# Patient Record
Sex: MALE | Race: WHITE | NOT HISPANIC OR LATINO | Employment: FULL TIME | URBAN - NONMETROPOLITAN AREA
[De-identification: names, ages, dates, MRNs, and addresses within clinical notes are randomized per-mention and may not be internally consistent; named-entity substitution may affect disease eponyms.]

---

## 2020-05-26 ENCOUNTER — OFFICE VISIT (OUTPATIENT)
Dept: INTERNAL MEDICINE | Facility: CLINIC | Age: 55
End: 2020-05-26

## 2020-05-26 VITALS
WEIGHT: 145 LBS | SYSTOLIC BLOOD PRESSURE: 148 MMHG | TEMPERATURE: 98.7 F | HEIGHT: 65 IN | HEART RATE: 107 BPM | BODY MASS INDEX: 24.16 KG/M2 | OXYGEN SATURATION: 98 % | DIASTOLIC BLOOD PRESSURE: 84 MMHG

## 2020-05-26 DIAGNOSIS — M79.642 LEFT HAND PAIN: ICD-10-CM

## 2020-05-26 DIAGNOSIS — R07.82 INTERCOSTAL PAIN: ICD-10-CM

## 2020-05-26 DIAGNOSIS — Z00.00 ANNUAL PHYSICAL EXAM: ICD-10-CM

## 2020-05-26 DIAGNOSIS — J44.9 CHRONIC OBSTRUCTIVE PULMONARY DISEASE, UNSPECIFIED COPD TYPE (HCC): ICD-10-CM

## 2020-05-26 DIAGNOSIS — Z12.5 SCREENING FOR PROSTATE CANCER: ICD-10-CM

## 2020-05-26 DIAGNOSIS — Z12.11 ENCOUNTER FOR COLORECTAL CANCER SCREENING: ICD-10-CM

## 2020-05-26 DIAGNOSIS — R09.89 BILATERAL CAROTID BRUITS: ICD-10-CM

## 2020-05-26 DIAGNOSIS — I10 ESSENTIAL HYPERTENSION: ICD-10-CM

## 2020-05-26 DIAGNOSIS — F17.200 NICOTINE DEPENDENCE WITH CURRENT USE: ICD-10-CM

## 2020-05-26 DIAGNOSIS — Z11.59 ENCOUNTER FOR HEPATITIS C SCREENING TEST FOR LOW RISK PATIENT: Primary | ICD-10-CM

## 2020-05-26 DIAGNOSIS — Z12.12 ENCOUNTER FOR COLORECTAL CANCER SCREENING: ICD-10-CM

## 2020-05-26 PROCEDURE — 99204 OFFICE O/P NEW MOD 45 MIN: CPT | Performed by: NURSE PRACTITIONER

## 2020-05-26 PROCEDURE — 93000 ELECTROCARDIOGRAM COMPLETE: CPT | Performed by: NURSE PRACTITIONER

## 2020-05-26 RX ORDER — LISINOPRIL 10 MG/1
10 TABLET ORAL DAILY
Qty: 30 TABLET | Refills: 5 | Status: SHIPPED | OUTPATIENT
Start: 2020-05-26

## 2020-05-26 RX ORDER — ALBUTEROL SULFATE 90 UG/1
2 AEROSOL, METERED RESPIRATORY (INHALATION) EVERY 4 HOURS PRN
Qty: 1 INHALER | Refills: 5 | Status: SHIPPED | OUTPATIENT
Start: 2020-05-26

## 2020-05-26 RX ORDER — OMEPRAZOLE 20 MG/1
20 CAPSULE, DELAYED RELEASE ORAL DAILY
COMMUNITY

## 2020-05-26 NOTE — PROGRESS NOTES
"Subjective   Nilson Waller is a 54 y.o. male.   Chief Complaint   Patient presents with   • Hand Problem     left    • Establish Care     wants yearly labs, but not today not fasting.       Mr. Waller presents to the clinic today to establish care. He is concerned about his left hand \" cannot remember how I hurt it\". Moderate pain with palpation of the PIP on pointer finger with swelling and reduced ROM. He had been taking ibuprofen and tylenol for mild relief in pain. He would like a x-ray and referral if needed.     Patient is current 3/4 ppd smoker, and had reduced recently (last 3-4 months). He was smoking 1 1/2 ppd. He started smoking at the ago of 18. He is fearful of cancer related to left sided chest pain, worse with inspiration intermittently. Completed EKG today and benign for ischemia/acute changes. He is wheezing in middle and lower bases of lungs and reduced throughout with worse reduction in the apexes. He reports poor compliance with spiriva related to cost. He does not use albuterol. Could not afford second inhaler. He would like to start medicine to take daily. Unable to afford spirivia at this time would be open to trying new medicine related to cost. Discussed smoking cessation with patient for 5 minutes of the total visit time. He is not ready to quit smoking and refused information or treatment at this time.    Carotid bruits auscultated today. Tachycardia present at visit today. He reports that he was anxious about our visit today. He does not drink a lot of water and drinks many soda drinks. His cardiac exam was benign. I had Dr. Guevara listen to heart sounds as well to rule out murmur. Patient taking ASA periodically. He does not currently blood pressure medicine, but was on medicine previously. Cannot remember today what he was on before. He denies ever needing to see Cardiologist and reports intermittent chest pain, not associated with activity, but is sharp in severity and occasionally radiates " "to neck. Patient reports duration as seconds or minutes. He reports last episode was while driving, at midclavicular line at 5th intercostal space and lasted less than a minute. He reports that \"it just went away on its own\".     Patient does not want immunizations completed today and refused TDAP.     Patient is interested in colonoscopy today.            The following portions of the patient's history were reviewed and updated as appropriate: allergies, current medications, past family history, past medical history, past social history, past surgical history and problem list.    Review of Systems   Constitutional: Positive for fatigue. Negative for activity change, appetite change, fever, unexpected weight gain and unexpected weight loss.   HENT: Positive for postnasal drip, rhinorrhea and sneezing. Negative for swollen glands, trouble swallowing and voice change.    Eyes: Negative for blurred vision and visual disturbance.   Respiratory: Positive for cough, chest tightness and shortness of breath.    Cardiovascular: Positive for chest pain. Negative for palpitations and leg swelling.        Unspecified chest pain, does not occur with activity and EKG normal.   Gastrointestinal: Negative for abdominal pain, constipation, diarrhea, nausea, vomiting and indigestion.   Endocrine: Negative for cold intolerance, heat intolerance, polydipsia and polyphagia.   Genitourinary: Positive for erectile dysfunction. Negative for dysuria and frequency.   Musculoskeletal: Positive for arthralgias, back pain and myalgias. Negative for joint swelling and neck pain.   Skin: Negative for color change, rash and skin lesions.   Allergic/Immunologic: Positive for environmental allergies.   Neurological: Negative for dizziness, weakness, headache, memory problem and confusion.   Hematological: Does not bruise/bleed easily.   Psychiatric/Behavioral: Positive for stress. Negative for agitation, hallucinations, sleep disturbance, suicidal " ideas and depressed mood. The patient is not nervous/anxious.        Objective   Past Medical History:   Diagnosis Date   • Arthritis    • Asthma    • GERD (gastroesophageal reflux disease)    • Hypertension    • Thyroid disorder       Past Surgical History:   Procedure Laterality Date   • APPENDECTOMY     • OTHER SURGICAL HISTORY      right elbow        Current Outpatient Medications:   •  omeprazole (priLOSEC) 20 MG capsule, Take 20 mg by mouth Daily., Disp: , Rfl:   •  albuterol sulfate  (90 Base) MCG/ACT inhaler, Inhale 2 puffs Every 4 (Four) Hours As Needed for Wheezing or Shortness of Air., Disp: 1 inhaler, Rfl: 5  •  fluticasone-salmeterol (ADVAIR) 100-50 MCG/DOSE DISKUS, Inhale 1 puff 2 (Two) Times a Day., Disp: 60 each, Rfl: 5  •  lisinopril (PRINIVIL,ZESTRIL) 10 MG tablet, Take 1 tablet by mouth Daily., Disp: 30 tablet, Rfl: 5     Vitals:    05/26/20 1533   BP: 148/84   Pulse: 107   Temp: 98.7 °F (37.1 °C)   SpO2: 98%         05/26/20  1533   Weight: 65.8 kg (145 lb)     Patient's Body mass index is 24.13 kg/m². BMI is within normal parameters. No follow-up required..      Physical Exam   Constitutional: He is oriented to person, place, and time. He appears well-developed and well-nourished.   HENT:   Head: Normocephalic and atraumatic.   Right Ear: Hearing, tympanic membrane, external ear and ear canal normal.   Left Ear: Hearing, tympanic membrane, external ear and ear canal normal.   Nose: Nose normal.   Mouth/Throat: Uvula is midline and oropharynx is clear and moist.   Eyes: Pupils are equal, round, and reactive to light. Conjunctivae and EOM are normal.   Neck: Normal range of motion. Neck supple. Carotid bruit is present. No thyromegaly present.   Cardiovascular: Normal rate, regular rhythm, normal heart sounds and intact distal pulses.   Pulmonary/Chest: Effort normal. He has decreased breath sounds in the right upper field and the left upper field. He has wheezes in the right middle field,  the right lower field, the left middle field and the left lower field.   Abdominal: Soft. Bowel sounds are normal. There is no tenderness.   Musculoskeletal:        Left hand: He exhibits decreased range of motion, bony tenderness and swelling. He exhibits normal capillary refill and no laceration. Normal sensation noted.        Hands:  Lymphadenopathy:     He has no cervical adenopathy.     He has no axillary adenopathy.   Neurological: He is alert and oriented to person, place, and time. He has normal strength. He displays a negative Romberg sign.   Skin: Skin is warm, dry and intact. Capillary refill takes less than 2 seconds. Turgor is normal. No rash noted. Nails show clubbing.   Psychiatric: Judgment and thought content normal. His mood appears anxious. His speech is rapid and/or pressured. He is agitated. Cognition and memory are normal.   Nursing note and vitals reviewed.            Assessment/Plan   Diagnoses and all orders for this visit:    1. Encounter for hepatitis C screening test for low risk patient (Primary)  -     Cancel: Hepatitis C Antibody; Future  -     Hepatitis C Antibody    2. Screening for prostate cancer  -     PSA Screen    3. Annual physical exam  -     Lipid Panel; Future  -     MicroAlbumin, Urine, Random - Urine, Clean Catch; Future  -     Urinalysis without microscopic (no culture) - Urine, Clean Catch; Future  -     Cancel: Uric Acid; Future  -     CBC & Differential  -     Comprehensive Metabolic Panel  -     TSH  -     Uric Acid    4. Left hand pain  -     XR Hand 3+ View Left; Future    5. Essential hypertension  -     lisinopril (PRINIVIL,ZESTRIL) 10 MG tablet; Take 1 tablet by mouth Daily.  Dispense: 30 tablet; Refill: 5    6. Encounter for colorectal cancer screening  -     Ambulatory Referral to Gastroenterology    7. Nicotine dependence with current use  -     CT Chest Low Dose Wo; Future    8. Bilateral carotid bruits  -     US Carotid Bilateral; Future    9. Chronic  "obstructive pulmonary disease, unspecified COPD type (CMS/[facility])  -     albuterol sulfate  (90 Base) MCG/ACT inhaler; Inhale 2 puffs Every 4 (Four) Hours As Needed for Wheezing or Shortness of Air.  Dispense: 1 inhaler; Refill: 5  -     fluticasone-salmeterol (ADVAIR) 100-50 MCG/DOSE DISKUS; Inhale 1 puff 2 (Two) Times a Day.  Dispense: 60 each; Refill: 5        Mr. Waller presents to the clinic today to establish care. He is concerned about his left hand \" cannot remember how I hurt it\". Moderate pain with palpation of the PIP on pointer finger with swelling and reduced ROM. Will complete a x-ray of hand and refer to OI if surgery is necessary.     Patient is current 3/4 ppd smoker, and had reduced recently (last 3-4 months). He was smoking 1 1/2 ppd. He started smoking at the ago of 18. He is fearful of cancer related to left sided chest pain, worse with inspiration intermittently. Completed EKG today and benign for ischemia/acute changes. He is wheezing in middle and lower bases of lungs and reduced throughout with worse reduction in the apexes. He reports poor compliance with spiriva related to cost. He does not use albuterol. Could not afford second inhaler. Will trial advair BID and albuterol as needed. He is agreeable to the medicine change. Will assess compliance at next visit.     Carotid bruits apprised today. Will completed doppler bilaterally. Will refer to vascular surgery if indicated. If 50-69% will recheck yearly. Patient taking ASA periodically. Advised to take daily. And eliminate diet high in fat, increase dietary fiber, and increase hydration.     Patient does not want immunizations completed today and refused TDAP. Will bring up at next month follow up.    Discussed smoking cessation with patient for 5 minutes of the total visit time. He is not ready to quit smoking and refused information or treatment at this time. Will reassess readiness to quit at every appointment and offer Wellbutrin " "next visit if he would like assistance. He had tried \"cold turkey\" and \"patches in the past without improvement\".    Patient is interested in colonoscopy today. Will place referral for Dr. Urbina to complete initial colonoscopy.     Patient is not fasting today and with his history of elevated cholesterol, advised the patient to return next week fasting to complete lipid panel and urine screen. Will review CBC, CMP, uric acid and TSH with patient next visit.       ECG 12 Lead  Date/Time: 5/27/2020 1:30 PM  Performed by: Bradford Guevara MD  Authorized by: Linda Wu APRN   Comparison: not compared with previous ECG   Rhythm: sinus rhythm    Clinical impression: normal ECG             "

## 2020-05-27 DIAGNOSIS — E06.3 HYPOTHYROIDISM DUE TO HASHIMOTO'S THYROIDITIS: Primary | ICD-10-CM

## 2020-05-27 DIAGNOSIS — E03.8 HYPOTHYROIDISM DUE TO HASHIMOTO'S THYROIDITIS: Primary | ICD-10-CM

## 2020-05-27 LAB
ALBUMIN SERPL-MCNC: 4.3 G/DL (ref 3.8–4.9)
ALBUMIN/GLOB SERPL: 1.7 {RATIO} (ref 1.2–2.2)
ALP SERPL-CCNC: 119 IU/L (ref 39–117)
ALT SERPL-CCNC: 30 IU/L (ref 0–44)
AST SERPL-CCNC: 23 IU/L (ref 0–40)
BASOPHILS # BLD AUTO: 0.1 X10E3/UL (ref 0–0.2)
BASOPHILS NFR BLD AUTO: 1 %
BILIRUB SERPL-MCNC: <0.2 MG/DL (ref 0–1.2)
BUN SERPL-MCNC: 16 MG/DL (ref 6–24)
BUN/CREAT SERPL: 15 (ref 9–20)
CALCIUM SERPL-MCNC: 9.4 MG/DL (ref 8.7–10.2)
CHLORIDE SERPL-SCNC: 108 MMOL/L (ref 96–106)
CO2 SERPL-SCNC: 21 MMOL/L (ref 20–29)
CREAT SERPL-MCNC: 1.1 MG/DL (ref 0.76–1.27)
EOSINOPHIL # BLD AUTO: 0.1 X10E3/UL (ref 0–0.4)
EOSINOPHIL NFR BLD AUTO: 1 %
ERYTHROCYTE [DISTWIDTH] IN BLOOD BY AUTOMATED COUNT: 13.4 % (ref 11.6–15.4)
GLOBULIN SER CALC-MCNC: 2.5 G/DL (ref 1.5–4.5)
GLUCOSE SERPL-MCNC: 100 MG/DL (ref 65–99)
HCT VFR BLD AUTO: 51 % (ref 37.5–51)
HCV AB S/CO SERPL IA: <0.1 S/CO RATIO (ref 0–0.9)
HGB BLD-MCNC: 17.6 G/DL (ref 13–17.7)
IMM GRANULOCYTES # BLD AUTO: 0 X10E3/UL (ref 0–0.1)
IMM GRANULOCYTES NFR BLD AUTO: 0 %
LYMPHOCYTES # BLD AUTO: 2.1 X10E3/UL (ref 0.7–3.1)
LYMPHOCYTES NFR BLD AUTO: 23 %
Lab: NORMAL
MCH RBC QN AUTO: 30.4 PG (ref 26.6–33)
MCHC RBC AUTO-ENTMCNC: 34.5 G/DL (ref 31.5–35.7)
MCV RBC AUTO: 88 FL (ref 79–97)
MONOCYTES # BLD AUTO: 0.5 X10E3/UL (ref 0.1–0.9)
MONOCYTES NFR BLD AUTO: 6 %
NEUTROPHILS # BLD AUTO: 6.3 X10E3/UL (ref 1.4–7)
NEUTROPHILS NFR BLD AUTO: 69 %
PLATELET # BLD AUTO: 295 X10E3/UL (ref 150–450)
POTASSIUM SERPL-SCNC: 4.6 MMOL/L (ref 3.5–5.2)
PROT SERPL-MCNC: 6.8 G/DL (ref 6–8.5)
PSA SERPL-MCNC: 0.6 NG/ML (ref 0–4)
RBC # BLD AUTO: 5.78 X10E6/UL (ref 4.14–5.8)
SODIUM SERPL-SCNC: 142 MMOL/L (ref 134–144)
T3FREE SERPL-MCNC: 3.5 PG/ML (ref 2–4.4)
T4 FREE SERPL-MCNC: 1.25 NG/DL (ref 0.82–1.77)
THYROPEROXIDASE AB SERPL-ACNC: 408 IU/ML (ref 0–34)
TSH SERPL DL<=0.005 MIU/L-ACNC: 5.28 UIU/ML (ref 0.45–4.5)
URATE SERPL-MCNC: 4.7 MG/DL (ref 3.7–8.6)
WBC # BLD AUTO: 9.1 X10E3/UL (ref 3.4–10.8)
WRITTEN AUTHORIZATION: NORMAL

## 2020-06-03 ENCOUNTER — HOSPITAL ENCOUNTER (OUTPATIENT)
Dept: ULTRASOUND IMAGING | Facility: HOSPITAL | Age: 55
Discharge: HOME | End: 2020-06-03
Admitting: NURSE PRACTITIONER

## 2020-06-03 DIAGNOSIS — E06.3 HYPOTHYROIDISM DUE TO HASHIMOTO'S THYROIDITIS: ICD-10-CM

## 2020-06-03 DIAGNOSIS — E03.8 HYPOTHYROIDISM DUE TO HASHIMOTO'S THYROIDITIS: ICD-10-CM

## 2020-06-03 PROCEDURE — 76536 US EXAM OF HEAD AND NECK: CPT

## 2020-06-09 ENCOUNTER — RESULTS ENCOUNTER (OUTPATIENT)
Dept: INTERNAL MEDICINE | Facility: CLINIC | Age: 55
End: 2020-06-09

## 2020-06-09 DIAGNOSIS — Z00.00 ANNUAL PHYSICAL EXAM: ICD-10-CM

## 2020-06-16 ENCOUNTER — HOSPITAL ENCOUNTER (OUTPATIENT)
Dept: CT IMAGING | Facility: HOSPITAL | Age: 55
Discharge: HOME | End: 2020-06-16
Admitting: NURSE PRACTITIONER

## 2020-06-16 ENCOUNTER — HOSPITAL ENCOUNTER (OUTPATIENT)
Dept: ULTRASOUND IMAGING | Facility: HOSPITAL | Age: 55
Discharge: HOME | End: 2020-06-16

## 2020-06-16 DIAGNOSIS — R09.89 BILATERAL CAROTID BRUITS: ICD-10-CM

## 2020-06-16 DIAGNOSIS — F17.200 NICOTINE DEPENDENCE WITH CURRENT USE: ICD-10-CM

## 2020-06-16 PROCEDURE — 93880 EXTRACRANIAL BILAT STUDY: CPT | Performed by: SURGERY

## 2020-06-16 PROCEDURE — 93880 EXTRACRANIAL BILAT STUDY: CPT

## 2020-06-16 PROCEDURE — G0297 LDCT FOR LUNG CA SCREEN: HCPCS

## 2020-06-17 ENCOUNTER — OFFICE VISIT (OUTPATIENT)
Dept: INTERNAL MEDICINE | Facility: CLINIC | Age: 55
End: 2020-06-17

## 2020-06-17 VITALS
OXYGEN SATURATION: 98 % | HEIGHT: 65 IN | HEART RATE: 102 BPM | BODY MASS INDEX: 23.82 KG/M2 | DIASTOLIC BLOOD PRESSURE: 84 MMHG | TEMPERATURE: 98.8 F | WEIGHT: 143 LBS | SYSTOLIC BLOOD PRESSURE: 142 MMHG

## 2020-06-17 DIAGNOSIS — E06.3 HASHIMOTO'S THYROIDITIS: Primary | ICD-10-CM

## 2020-06-17 DIAGNOSIS — M19.90 ARTHRITIS: ICD-10-CM

## 2020-06-17 DIAGNOSIS — G89.29 CHRONIC HAND PAIN, LEFT: ICD-10-CM

## 2020-06-17 DIAGNOSIS — M79.642 CHRONIC HAND PAIN, LEFT: ICD-10-CM

## 2020-06-17 PROBLEM — J45.20 MILD INTERMITTENT ASTHMA WITHOUT COMPLICATION: Status: ACTIVE | Noted: 2020-06-17

## 2020-06-17 LAB
APPEARANCE UR: CLEAR
BILIRUB UR QL STRIP: NEGATIVE
CHOLEST SERPL-MCNC: 211 MG/DL (ref 100–199)
COLOR UR: YELLOW
GLUCOSE UR QL: NEGATIVE
HDLC SERPL-MCNC: 58 MG/DL
HGB UR QL STRIP: NEGATIVE
KETONES UR QL STRIP: NEGATIVE
LDLC SERPL CALC-MCNC: 138 MG/DL (ref 0–99)
LEUKOCYTE ESTERASE UR QL STRIP: NEGATIVE
MICROALBUMIN UR-MCNC: 10.5 UG/ML
NITRITE UR QL STRIP: NEGATIVE
PH UR STRIP: 5 [PH] (ref 5–7.5)
PROT UR QL STRIP: ABNORMAL
SP GR UR: >=1.03 (ref 1–1.03)
TRIGL SERPL-MCNC: 75 MG/DL (ref 0–149)
UROBILINOGEN UR STRIP-MCNC: 1 MG/DL (ref 0.2–1)
VLDLC SERPL CALC-MCNC: 15 MG/DL (ref 5–40)

## 2020-06-17 PROCEDURE — 99214 OFFICE O/P EST MOD 30 MIN: CPT | Performed by: NURSE PRACTITIONER

## 2020-06-17 RX ORDER — LEVOTHYROXINE SODIUM 0.03 MG/1
25 TABLET ORAL DAILY
Qty: 30 TABLET | Refills: 3 | Status: SHIPPED | OUTPATIENT
Start: 2020-06-17 | End: 2020-12-17 | Stop reason: SDUPTHER

## 2020-06-17 NOTE — PROGRESS NOTES
"Subjective   Nilson Waller is a 54 y.o. male.   Chief Complaint   Patient presents with   • Follow-up     Review lab work and imgaing.    • Hand Pain     Left       Mr. Waller is a 54 y.o. Male who present to the office to discuss recent lab and diagnostic imaging. He presented to the office for the first time last visit to establish care and discuss left hand pain.  He is concerned about his left hand still \" cannot remember how I hurt it\". Moderate pain with palpation of the PIP on pointer finger with swelling and reduced ROM. He had been taking ibuprofen and tylenol for mild relief in pain. He did not get the xray completed before the visit today.  He reports that he has had chronic pain in his neck mid back and low back.  He has never had imaging done to check for arthritic changes.  He works a variably worse job and most likely wear-and-tear arthritis.  He denies numbness tingling weakness in his upper or lower extremities and reports that his pain is a dull ache and does not limit him to complete his work.    Upon reviewing his labs noticed that his TSH was slightly elevated.  Proceeded to check free T3 and free T4 and TPO.  His thyroid antibodies were elevated 640 indicating Hashimoto's.  Patient reports that he has been having some intermittent issues with constipation, fatigue, mild hair loss.  He would like to start medication today.     Patient is current 3/4 ppd smoker, and had reduced recently (last 3-4 months). He was smoking 1 1/2 ppd. He started smoking at the ago of 18. He is fearful of cancer related to left sided chest pain, worse with inspiration intermittently. Completed EKG today and benign for ischemia/acute changes. He is wheezing in middle and lower bases of lungs and reduced throughout with worse reduction in the apexes. He reports poor compliance with spiriva related to cost. He does not use albuterol. Could not afford second inhaler. He would like to start medicine to take daily. Unable to " "jarocho mccall at this time would be open to trying new medicine related to cost. Discussed smoking cessation with patient for 5 minutes of the total visit time. He is not ready to quit smoking and refused information or treatment at this time.     Carotid bruits auscultated, imaging reviewed with patient and negative for stenosis >50%. Tachycardia present at visit today. He reports that he was anxious about our visit today. He does not drink a lot of water and drinks many soda drinks. His cardiac exam was benign. I had Dr. Guevara listen to heart sounds as well to rule out murmur. Patient taking ASA periodically. He does not currently blood pressure medicine, but was on medicine previously. Cannot remember today what he was on before. He denies ever needing to see Cardiologist and reports intermittent chest pain, not associated with activity, but is sharp in severity and occasionally radiates to neck. Patient reports duration as seconds or minutes. He reports last episode was while driving, at midclavicular line at 5th intercostal space and lasted less than a minute. He reports that \"it just went away on its own\".        The following portions of the patient's history were reviewed and updated as appropriate: allergies, current medications, past family history, past medical history, past social history, past surgical history and problem list.    Review of Systems   Constitutional: Positive for fatigue. Negative for activity change, appetite change, fever, unexpected weight gain and unexpected weight loss.   HENT: Negative for swollen glands, trouble swallowing and voice change.    Eyes: Negative for blurred vision and visual disturbance.   Respiratory: Negative for cough and shortness of breath.    Cardiovascular: Negative for chest pain, palpitations and leg swelling.   Gastrointestinal: Positive for constipation. Negative for abdominal pain, diarrhea, nausea, vomiting and indigestion.   Endocrine: Negative for " cold intolerance, heat intolerance, polydipsia and polyphagia.   Genitourinary: Negative for dysuria and frequency.   Musculoskeletal: Positive for arthralgias, back pain, joint swelling and neck pain.   Skin: Negative for color change, rash and skin lesions.   Neurological: Negative for dizziness, weakness, headache, memory problem and confusion.   Hematological: Does not bruise/bleed easily.   Psychiatric/Behavioral: Negative for agitation, hallucinations and suicidal ideas. The patient is nervous/anxious.        Objective   Past Medical History:   Diagnosis Date   • Arthritis    • Asthma    • GERD (gastroesophageal reflux disease)    • Hypertension    • Thyroid disorder       Past Surgical History:   Procedure Laterality Date   • APPENDECTOMY     • OTHER SURGICAL HISTORY      right elbow        Current Outpatient Medications:   •  albuterol sulfate  (90 Base) MCG/ACT inhaler, Inhale 2 puffs Every 4 (Four) Hours As Needed for Wheezing or Shortness of Air., Disp: 1 inhaler, Rfl: 5  •  fluticasone-salmeterol (ADVAIR) 100-50 MCG/DOSE DISKUS, Inhale 1 puff 2 (Two) Times a Day., Disp: 60 each, Rfl: 5  •  lisinopril (PRINIVIL,ZESTRIL) 10 MG tablet, Take 1 tablet by mouth Daily., Disp: 30 tablet, Rfl: 5  •  omeprazole (priLOSEC) 20 MG capsule, Take 20 mg by mouth Daily., Disp: , Rfl:   •  diclofenac (VOLTAREN) 1 % gel gel, Apply 4 g topically to the appropriate area as directed 2 (Two) Times a Day., Disp: 1 tube, Rfl: 4  •  levothyroxine (SYNTHROID, LEVOTHROID) 25 MCG tablet, Take 1 tablet by mouth Daily., Disp: 30 tablet, Rfl: 3     Vitals:    06/17/20 1105   BP: 142/84   Pulse: 102   Temp: 98.8 °F (37.1 °C)   SpO2: 98%         06/17/20  1105   Weight: 64.9 kg (143 lb)     Patient's Body mass index is 23.8 kg/m². BMI is within normal parameters. No follow-up required..      Physical Exam   Constitutional: He is oriented to person, place, and time. He appears well-developed and well-nourished.   HENT:   Head:  Normocephalic and atraumatic.   Right Ear: Hearing, tympanic membrane, external ear and ear canal normal.   Left Ear: Hearing, tympanic membrane, external ear and ear canal normal.   Nose: Nose normal.   Mouth/Throat: Uvula is midline, oropharynx is clear and moist and mucous membranes are normal.   Eyes: Pupils are equal, round, and reactive to light. Conjunctivae and EOM are normal.   Neck: Normal range of motion. Neck supple. No thyromegaly present.   Cardiovascular: Normal rate, regular rhythm, normal heart sounds and intact distal pulses.   Pulmonary/Chest: Effort normal and breath sounds normal.   Abdominal: Soft. Bowel sounds are normal.   Musculoskeletal:        Cervical back: He exhibits decreased range of motion and tenderness.        Thoracic back: He exhibits decreased range of motion and tenderness.        Lumbar back: He exhibits decreased range of motion and tenderness.        Left hand: He exhibits decreased range of motion, tenderness and swelling. Normal sensation noted. Decreased strength noted. He exhibits thumb/finger opposition.   Lymphadenopathy:     He has no cervical adenopathy.     He has no axillary adenopathy.   Neurological: He is alert and oriented to person, place, and time. He has normal strength. He displays a negative Romberg sign.   Skin: Skin is warm, dry and intact. Capillary refill takes less than 2 seconds. Turgor is normal. No lesion and no rash noted.   Psychiatric: He has a normal mood and affect. His speech is normal and behavior is normal. Judgment and thought content normal. Cognition and memory are normal.   Nursing note and vitals reviewed.            Assessment/Plan   Diagnoses and all orders for this visit:    1. Hashimoto's thyroiditis (Primary)  -     levothyroxine (SYNTHROID, LEVOTHROID) 25 MCG tablet; Take 1 tablet by mouth Daily.  Dispense: 30 tablet; Refill: 3  -     TSH; Future    2. Chronic hand pain, left  -     diclofenac (VOLTAREN) 1 % gel gel; Apply 4 g  topically to the appropriate area as directed 2 (Two) Times a Day.  Dispense: 1 tube; Refill: 4    3. Arthritis      Will have x-ray of left hand completed today.  Will send them Voltaren gel for him to use twice daily as needed.  Advised him he can take Tylenol as needed for additional pain relief.  Advised him to start Synthroid 25 mcg and will recheck his TSH level and 6 to 8 weeks.  Patient will return in 3 months to check on his overall status.

## 2020-06-17 NOTE — PATIENT INSTRUCTIONS
Hyperthyroidism    Hyperthyroidism is when the thyroid gland is too active (overactive). The thyroid gland is a small gland located in the lower front part of the neck, just in front of the windpipe (trachea). This gland makes hormones that help control how the body uses food for energy (metabolism) as well as how the heart and brain function. These hormones also play a role in keeping your bones strong. When the thyroid is overactive, it produces too much of a hormone called thyroxine.  What are the causes?  This condition may be caused by:  · Graves' disease. This is a disorder in which the body's disease-fighting system (immune system) attacks the thyroid gland. This is the most common cause.  · Inflammation of the thyroid gland.  · A tumor in the thyroid gland.  · Use of certain medicines, including:  ? Prescription thyroid hormone replacement.  ? Herbal supplements that mimic thyroid hormones.  ? Amiodarone therapy.  · Solid or fluid-filled lumps within your thyroid gland (thyroid nodules).  · Taking in a large amount of iodine from foods or medicines.  What increases the risk?  You are more likely to develop this condition if:  · You are female.  · You have a family history of thyroid conditions.  · You smoke tobacco.  · You use a medicine called lithium.  · You take medicines that affect the immune system (immunosuppressants).  What are the signs or symptoms?  Symptoms of this condition include:  · Nervousness.  · Inability to tolerate heat.  · Unexplained weight loss.  · Diarrhea.  · Change in the texture of hair or skin.  · Heart skipping beats or making extra beats.  · Rapid heart rate.  · Loss of menstruation.  · Shaky hands.  · Fatigue.  · Restlessness.  · Sleep problems.  · Enlarged thyroid gland or a lump in the thyroid (nodule).  You may also have symptoms of Graves' disease, which may include:  · Protruding eyes.  · Dry eyes.  · Red or swollen eyes.  · Problems with vision.  How is this  diagnosed?  This condition may be diagnosed based on:  · Your symptoms and medical history.  · A physical exam.  · Blood tests.  · Thyroid ultrasound. This test involves using sound waves to produce images of the thyroid gland.  · A thyroid scan. A radioactive substance is injected into a vein, and images show how much iodine is present in the thyroid.  · Radioactive iodine uptake test (RAIU). A small amount of radioactive iodine is given by mouth to see how much iodine the thyroid absorbs after a certain amount of time.  How is this treated?  Treatment depends on the cause and severity of the condition. Treatment may include:  · Medicines to reduce the amount of thyroid hormone your body makes.  · Radioactive iodine treatment (radioiodine therapy). This involves swallowing a small dose of radioactive iodine, in capsule or liquid form, to kill thyroid cells.  · Surgery to remove part or all of your thyroid gland. You may need to take thyroid hormone replacement medicine for the rest of your life after thyroid surgery.  · Medicines to help manage your symptoms.  Follow these instructions at home:    · Take over-the-counter and prescription medicines only as told by your health care provider.  · Do not use any products that contain nicotine or tobacco, such as cigarettes and e-cigarettes. If you need help quitting, ask your health care provider.  · Follow any instructions from your health care provider about diet. You may be instructed to limit foods that contain iodine.  · Keep all follow-up visits as told by your health care provider. This is important.  ? You will need to have blood tests regularly so that your health care provider can monitor your condition.  Contact a health care provider if:  · Your symptoms do not get better with treatment.  · You have a fever.  · You are taking thyroid hormone replacement medicine and you:  ? Have symptoms of depression.  ? Feel like you are tired all the time.  ? Gain  weight.  Get help right away if:  · You have chest pain.  · You have decreased alertness or a change in your awareness.  · You have abdominal pain.  · You feel dizzy.  · You have a rapid heartbeat.  · You have an irregular heartbeat.  · You have difficulty breathing.  Summary  · The thyroid gland is a small gland located in the lower front part of the neck, just in front of the windpipe (trachea).  · Hyperthyroidism is when the thyroid gland is too active (overactive) and produces too much of a hormone called thyroxine.  · The most common cause is Graves' disease, a disorder in which your immune system attacks the thyroid gland.  · Hyperthyroidism can cause various symptoms, such as unexplained weight loss, nervousness, inability to tolerate heat, or changes in your heartbeat.  · Treatment may include medicine to reduce the amount of thyroid hormone your body makes, radioiodine therapy, surgery, or medicines to manage symptoms.  This information is not intended to replace advice given to you by your health care provider. Make sure you discuss any questions you have with your health care provider.  Document Released: 12/18/2006 Document Revised: 11/30/2018 Document Reviewed: 11/28/2018  [employer] Patient Education © 2020 [employer]

## 2020-09-17 ENCOUNTER — RESULTS ENCOUNTER (OUTPATIENT)
Dept: INTERNAL MEDICINE | Facility: CLINIC | Age: 55
End: 2020-09-17

## 2020-09-17 DIAGNOSIS — E06.3 HASHIMOTO'S THYROIDITIS: ICD-10-CM

## 2020-12-07 ENCOUNTER — LAB REQUISITION (OUTPATIENT)
Dept: LAB | Facility: HOSPITAL | Age: 55
End: 2020-12-07

## 2020-12-07 DIAGNOSIS — Z00.00 ENCOUNTER FOR GENERAL ADULT MEDICAL EXAMINATION WITHOUT ABNORMAL FINDINGS: ICD-10-CM

## 2020-12-07 LAB — SARS-COV-2 RNA PNL SPEC NAA+PROBE: NOT DETECTED

## 2020-12-07 PROCEDURE — 87635 SARS-COV-2 COVID-19 AMP PRB: CPT

## 2020-12-11 ENCOUNTER — LAB REQUISITION (OUTPATIENT)
Dept: LAB | Facility: HOSPITAL | Age: 55
End: 2020-12-11

## 2020-12-11 DIAGNOSIS — Z00.00 ENCOUNTER FOR GENERAL ADULT MEDICAL EXAMINATION WITHOUT ABNORMAL FINDINGS: ICD-10-CM

## 2020-12-14 PROCEDURE — 88307 TISSUE EXAM BY PATHOLOGIST: CPT | Performed by: GENERAL PRACTICE

## 2020-12-15 LAB
CYTO UR: NORMAL
LAB AP CASE REPORT: NORMAL
LAB AP CLINICAL INFORMATION: NORMAL
PATH REPORT.FINAL DX SPEC: NORMAL
PATH REPORT.GROSS SPEC: NORMAL

## 2020-12-17 DIAGNOSIS — E06.3 HASHIMOTO'S THYROIDITIS: ICD-10-CM

## 2020-12-17 RX ORDER — LEVOTHYROXINE SODIUM 0.03 MG/1
25 TABLET ORAL DAILY
Qty: 30 TABLET | Refills: 5 | Status: SHIPPED | OUTPATIENT
Start: 2020-12-17

## 2020-12-18 ENCOUNTER — TELEPHONE (OUTPATIENT)
Dept: INTERNAL MEDICINE | Facility: CLINIC | Age: 55
End: 2020-12-18

## 2020-12-18 NOTE — TELEPHONE ENCOUNTER
Spoke with christ with  and gave instructions per oli to increase lisinopril to 20 mg BID ( pt had already been increased from 10 to 20 mg at [facility] after surgery.

## 2020-12-18 NOTE — TELEPHONE ENCOUNTER
THE PATIENTS RN FROM [facility] CALLED STATING THAT THE PATIENT HAS A BP /90 AND HEART RATE  NO OTHER SYMPTOMS BUT HAS A HIGH BP. IF WE HAVE ANY QUESTIONS FEEL FREE TO CALL    GOOD CALL BACK:  [phone]

## 2021-06-24 PROCEDURE — 88305 TISSUE EXAM BY PATHOLOGIST: CPT | Performed by: GENERAL PRACTICE

## 2021-06-25 ENCOUNTER — LAB REQUISITION (OUTPATIENT)
Dept: LAB | Facility: HOSPITAL | Age: 56
End: 2021-06-25

## 2021-06-25 DIAGNOSIS — Z00.00 ENCOUNTER FOR GENERAL ADULT MEDICAL EXAMINATION WITHOUT ABNORMAL FINDINGS: ICD-10-CM

## 2021-07-28 PROCEDURE — 88304 TISSUE EXAM BY PATHOLOGIST: CPT | Performed by: GENERAL PRACTICE

## 2021-07-29 ENCOUNTER — LAB REQUISITION (OUTPATIENT)
Dept: LAB | Facility: HOSPITAL | Age: 56
End: 2021-07-29

## 2021-07-29 DIAGNOSIS — Z00.00 ENCOUNTER FOR GENERAL ADULT MEDICAL EXAMINATION WITHOUT ABNORMAL FINDINGS: ICD-10-CM
